# Patient Record
Sex: FEMALE | Race: ASIAN | URBAN - METROPOLITAN AREA
[De-identification: names, ages, dates, MRNs, and addresses within clinical notes are randomized per-mention and may not be internally consistent; named-entity substitution may affect disease eponyms.]

---

## 2018-08-13 ENCOUNTER — OFFICE VISIT (OUTPATIENT)
Dept: FAMILY MEDICINE | Facility: CLINIC | Age: 18
End: 2018-08-13
Payer: COMMERCIAL

## 2018-08-13 VITALS — SYSTOLIC BLOOD PRESSURE: 97 MMHG | DIASTOLIC BLOOD PRESSURE: 59 MMHG | TEMPERATURE: 98.2 F

## 2018-08-13 DIAGNOSIS — Z23 NEED FOR VACCINATION: ICD-10-CM

## 2018-08-13 DIAGNOSIS — Z71.84 TRAVEL ADVICE ENCOUNTER: Primary | ICD-10-CM

## 2018-08-13 PROCEDURE — 99402 PREV MED CNSL INDIV APPRX 30: CPT | Mod: 25 | Performed by: NURSE PRACTITIONER

## 2018-08-13 PROCEDURE — 90691 TYPHOID VACCINE IM: CPT | Performed by: NURSE PRACTITIONER

## 2018-08-13 PROCEDURE — 90472 IMMUNIZATION ADMIN EACH ADD: CPT | Performed by: NURSE PRACTITIONER

## 2018-08-13 PROCEDURE — 90717 YELLOW FEVER VACCINE SUBQ: CPT | Performed by: NURSE PRACTITIONER

## 2018-08-13 PROCEDURE — 86480 TB TEST CELL IMMUN MEASURE: CPT | Performed by: NURSE PRACTITIONER

## 2018-08-13 PROCEDURE — 90715 TDAP VACCINE 7 YRS/> IM: CPT | Performed by: NURSE PRACTITIONER

## 2018-08-13 PROCEDURE — 90471 IMMUNIZATION ADMIN: CPT | Performed by: NURSE PRACTITIONER

## 2018-08-13 RX ORDER — ALBUTEROL SULFATE 90 UG/1
2 AEROSOL, METERED RESPIRATORY (INHALATION) EVERY 6 HOURS PRN
Qty: 1 INHALER | Refills: 0 | COMMUNITY
Start: 2018-08-13

## 2018-08-13 RX ORDER — AZITHROMYCIN 500 MG/1
500 TABLET, FILM COATED ORAL DAILY
Qty: 3 TABLET | Refills: 0 | Status: SHIPPED | OUTPATIENT
Start: 2018-08-13 | End: 2018-08-16

## 2018-08-13 RX ORDER — ATOVAQUONE AND PROGUANIL HYDROCHLORIDE 250; 100 MG/1; MG/1
1 TABLET, FILM COATED ORAL DAILY
Qty: 60 TABLET | Refills: 0 | Status: SHIPPED | OUTPATIENT
Start: 2018-08-13

## 2018-08-13 ASSESSMENT — ANXIETY QUESTIONNAIRES
GAD7 TOTAL SCORE: 3
6. BECOMING EASILY ANNOYED OR IRRITABLE: NOT AT ALL
3. WORRYING TOO MUCH ABOUT DIFFERENT THINGS: SEVERAL DAYS
5. BEING SO RESTLESS THAT IT IS HARD TO SIT STILL: NOT AT ALL
2. NOT BEING ABLE TO STOP OR CONTROL WORRYING: SEVERAL DAYS
7. FEELING AFRAID AS IF SOMETHING AWFUL MIGHT HAPPEN: NOT AT ALL
1. FEELING NERVOUS, ANXIOUS, OR ON EDGE: SEVERAL DAYS
IF YOU CHECKED OFF ANY PROBLEMS ON THIS QUESTIONNAIRE, HOW DIFFICULT HAVE THESE PROBLEMS MADE IT FOR YOU TO DO YOUR WORK, TAKE CARE OF THINGS AT HOME, OR GET ALONG WITH OTHER PEOPLE: NOT DIFFICULT AT ALL

## 2018-08-13 ASSESSMENT — PATIENT HEALTH QUESTIONNAIRE - PHQ9: 5. POOR APPETITE OR OVEREATING: NOT AT ALL

## 2018-08-13 NOTE — MR AVS SNAPSHOT
"              After Visit Summary   8/13/2018    Mane Jimenes    MRN: [id number]           Patient Information     Date Of Birth          2000        Visit Information        Provider Department      8/13/2018 3:30 PM Lavonne Mccartney APRN [facility]        Today's Diagnoses     Travel advice encounter    -  1    Need for vaccination          Care Instructions    Today August 13, 2018 you received the    Yellow Fever (YF)    Tetanus (Tdap) Vaccine    Typhoid - injectable. This vaccine is valid for two years.   .    These appointments can be made as a NURSE ONLY visit.    **It is very important for the vaccinations to be given on the scheduled day(s), this helps ensure you receive the full effectiveness of the vaccine.**    Please call [facility] with any questions [phone]    Thank you for visiting [facility]              Follow-ups after your visit        Who to contact     If you have questions or need follow up information about today's clinic visit or your schedule please contact [facility] directly at [phone].  Normal or non-critical lab and imaging results will be communicated to you by Likeedshart, letter or phone within 4 business days after the clinic has received the results. If you do not hear from us within 7 days, please contact the clinic through Likeedshart or phone. If you have a critical or abnormal lab result, we will notify you by phone as soon as possible.  Submit refill requests through Bulu Box or call your pharmacy and they will forward the refill request to us. Please allow 3 business days for your refill to be completed.          Additional Information About Your Visit        MyChart Information     Bulu Box lets you send messages to your doctor, view your test results, renew your prescriptions, schedule appointments and more. To sign up, go to [email or web address] . Click on \"Log in\" on the left side of the " "screen, which will take you to the Welcome page. Then click on \"Sign up Now\" on the right side of the page.     You will be asked to enter the access code listed below, as well as some personal information. Please follow the directions to create your username and password.     Your access code is: [email or web address]  Expires: 2018  4:46 PM     Your access code will  in 90 days. If you need help or a new code, please call your [place] clinic or [phone].        Care EveryWhere ID     This is your Care EveryWhere ID. This could be used by other organizations to access your [place] [facility] medical records  [id number]        Your Vitals Were     Temperature                   98.2  F (36.8  C) (Tympanic)            Blood Pressure from Last 3 Encounters:   18 97/59    Weight from Last 3 Encounters:   No data found for Wt              We Performed the Following     C YELLOW FEVER IMMUNIZATION, LIVE, SQ (STAMARIL)     TDAP, IM (10 - 64 YRS) - Adacel     TYPHOID VACCINE, IM          Today's Medication Changes          These changes are accurate as of 18  4:46 PM.  If you have any questions, ask your nurse or doctor.               Start taking these medicines.        Dose/Directions    atovaquone-proguanil 250-100 MG per tablet   Commonly known as:  MALARONE   Used for:  Travel advice encounter   Started by:  Lavonne Mccartney APRN CNP        Dose:  1 tablet   Take 1 tablet by mouth daily Start 2 days before exposure to Malaria and continue daily till  7 days after exposure.   Quantity:  60 tablet   Refills:  0       azithromycin 500 MG tablet   Commonly known as:  ZITHROMAX   Used for:  Travel advice encounter   Started by:  Lavonne Mccartney APRN CNP        Dose:  500 mg   Take 1 tablet (500 mg) by mouth daily for 3 doses Take 1 tablet a day for up to 3 days for severe diarrhea   Quantity:  3 tablet   Refills:  0       beclomethasone HFA 40 MCG/ACT inhaler   Commonly known as:  QVAR REDIHALER   Used for:  " Need for vaccination, Travel advice encounter   Started by:  Lavonne Mccartney, AMARIS VELASQUEZ        Dose:  1 puff   Inhale 1 puff into the lungs 2 times daily   Quantity:  10.6 g   Refills:  1            Where to get your medicines      These medications were sent to [facility]/pharmacy #[id number] - [place], [place] - [address]  [address] [place] [address]     Phone:  [phone]     atovaquone-proguanil 250-100 MG per tablet    azithromycin 500 MG tablet    beclomethasone HFA 40 MCG/ACT inhaler                Primary Care Provider Office Phone # Fax #    [facility] [phone] [address]  [facility] [address]        Equal Access to Services     NOE WEBB : Hadii aad ku hadasho Soomaali, waaxda luqadaha, qaybta kaalmada adeegyada, maria elena nagel . So [facility] [phone].    ATENCIÓN: Si habla español, tiene a wood disposición servicios gratuitos de asistencia lingüística. [facility] [phone].    We comply with applicable federal civil rights laws and [place] laws. We do not discriminate on the basis of race, color, national origin, age, disability, sex, sexual orientation, or gender identity.            Thank you!     Thank you for choosing [facility]  for your care. Our goal is always to provide you with excellent care. Hearing back from our patients is one way we can continue to improve our services. Please take a few minutes to complete the written survey that you may receive in the mail after your visit with us. Thank you!             Your Updated Medication List - Protect others around you: Learn how to safely use, store and throw away your medicines at www.disposemymeds.org.          This list is accurate as of 8/13/18  4:46 PM.  Always use your most recent med list.                   Brand Name Dispense Instructions for use Diagnosis    albuterol 108 (90 Base) MCG/ACT inhaler    PROAIR HFA/PROVENTIL HFA/VENTOLIN HFA    1  Inhaler    Inhale 2 puffs into the lungs every 6 hours as needed for shortness of breath / dyspnea or wheezing        atovaquone-proguanil 250-100 MG per tablet    MALARONE    60 tablet    Take 1 tablet by mouth daily Start 2 days before exposure to Malaria and continue daily till  7 days after exposure.    Travel advice encounter       azithromycin 500 MG tablet    ZITHROMAX    3 tablet    Take 1 tablet (500 mg) by mouth daily for 3 doses Take 1 tablet a day for up to 3 days for severe diarrhea    Travel advice encounter       beclomethasone HFA 40 MCG/ACT inhaler    QVAR REDIHALER    10.6 g    Inhale 1 puff into the lungs 2 times daily    Need for vaccination, Travel advice encounter

## 2018-08-13 NOTE — PATIENT INSTRUCTIONS
Today August 13, 2018 you received the    Yellow Fever (YF)    Tetanus (Tdap) Vaccine    Typhoid - injectable. This vaccine is valid for two years.   .    These appointments can be made as a NURSE ONLY visit.    **It is very important for the vaccinations to be given on the scheduled day(s), this helps ensure you receive the full effectiveness of the vaccine.**    Please call [facility] with any questions [phone]    Thank you for visiting [facility]

## 2018-08-13 NOTE — PROGRESS NOTES
Nurse Note      Itinerary:  [facility] and [place]      Departure Date: 9/8/18      Return Date: 12/27/18      Length of Trip 3 1/2 months      Reason for Travel: Study abroad           Urban or rural: both      Accommodations: Family home        IMMUNIZATION HISTORY  Have you received any immunizations within the past 4 weeks?  No  Have you ever fainted from having your blood drawn or from an injection?  No  Have you ever had a fever reaction to vaccination?  No  Have you ever had any bad reaction or side effect from any vaccination?  No  Have you ever had hepatitis A or B vaccine?  Yes  Do you live (or work closely) with anyone who has AIDS, an AIDS-like condition, any other immune disorder or who is on chemotherapy for cancer?  No  Do you have a family history of immunodeficiency?  No  Have you received any injection of immune globulin or any blood products during the past 12 months?  No    Patient roomed by Piero Byrdnithya Jimenes is a 18 year old female seen today alone for counsultation for international travel to [facility], [facility], [place] for Study abroad.  Patient will be departing in  3 week(s) and staying for   3.5 month(s) and  traveling as a student.  ([facility])    Patient itinerary :  will be in the [facility] for 1 month and [facility] > [facility] ( 1month) > [place] for 1 month which presents risk for Malaria, Dengue Fever, Chikungungya, Zika,  Trypanosomiasis, Schistosomiasis, Rabies, food borne illnesses, motor vehicle accidents, Typhoid, Leishmaniasis and Lassa Fever. exposure.      Patient's activities will include study abroad.    Patient's country of birth is [place]    Special medical concerns: asthma  Pre-travel questionnaire was completed by patient and reviewed by provider.     Vitals: BP 97/59 (BP Location: Left arm)  Temp 98.2  F (36.8  C) (Tympanic)  BMI= There is no height or weight on file to calculate BMI.    EXAM:  General:  Well-nourished, well-developed  in no acute distress.  Appears to be stated age, interacts appropriately and expresses understanding of information given to patient.    No current outpatient prescriptions on file.     There is no problem list on file for this patient.    No Known Allergies      Immunizations discussed include:   Hepatitis A:  Up to date  Hepatitis B: Up to date  Influenza: vaccine is not available  Typhoid: Ordered/given today, risks, benefits and side effects reviewed  Rabies: Insufficient time to vaccinate  Yellow Fever: Stamaril Ordered/given today - consent completed, side effects, precautions, allergies, risks discussed. Patient expressed understanding.  Turkish Encephalitis: Not indicated  Meningococcus: Up to date  Tetanus/Diphtheria: Ordered/given today, risks, benefits and side effects reviewed  Measles/Mumps/Rubella: Up to date  Cholera: Not needed  Polio: Up to date  Pneumococcal: Up to date  Varicella: Immune by disease history per patient report  Zostavax:  Not indicated  Shingrix: Not indicated  HPV:  Up to date  TB:  TB Gold tested today    Stamaril Informed Consent    The patient was provided with a copy of the IRB-approved consent form and all questions were answered before the patient agreed to participate by signing the informed consent document.   A copy of the form was provided to the patient.    Date: 08/13/2018  Consent Version Date: 05/10/2017  Consent Obtained by:  Lavonne Mccartney CNP (Lori)     HIPAA:  Yes  HIPAA Authorization Signed Date: 08/13/2018      Inclusion/Exclusion Criteria:    (Similar to Yellow Fever-VAX)      The patient met all of the following inclusion criteria in order to be eligible for the Stamaril vaccination under this EAP (Expanded Access Investigational New Drug Program)           At increased risk for YF, including researchers, laboratory workers, vaccine production staff, and those who are traveling within 30 days to a YF-endemic region or to a country requiring proof of YF  vaccination under IHRs (International Health Regulations)?       Yes     Patient is greater than or equal to 9 months of age on the day of vaccination?     Yes     Patient is greater than or equal to 18 years of age and signed and dated the Consent Forms?     Yes     Patient is < 18 years of age and parent(s)/guardian(s) signed and dated the Consent Forms?      Patient is 7 years to < 18 years of age and signed and dated the Assent form?        No Assent is required.  Patient is <7 years of age.     No      No      N/A     The patient did not meet any of the following criteria that would have excluded the patient from receiving the Stamaril vaccination under this EAP              Patient is less than 9 months of age.       No     The patient is breast-feeding and cannot stop nursing for at least 14 days after vaccination.    Note: Yellow Fever vaccine virus may be transmissible via breast milk by nursing mothers who are vaccinated during the final 2 weeks of pregnancy or post-partum.   Following transmission, infants may develop encephalitis.  The minimum time of discontinuation of breastfeeding for 14 days after vaccination is based on the expected clearance of live-attenuated vaccine virus.       No     The patient is immunosuppressed, whether congenital or idiopathic, including for example, leukemia, lymphoma, other malignancies, and patients who are receiving immunosuppressant medications (e.g. Systemic corticosteroids [greater than the standard dose of topical or inhaled steroids], alkylating drugs, antimetabolites, of other cytotoxic or immunomodulatory drugs) or radiation therapy or organ transplantation.       No     The patient has known hypersensitivity to the active substance or to any of the excipients of Stamaril vaccine or to eggs or chicken proteins.     No     The patient is symptomatic for human immunodeficiency virus (HIV) infection     No     The patient is asymptomatic for HIV infection but  accompanied by evidence of severe immune suppression    Note:  Evidence of severe immune suppression includes CD4+ T-cell counts < 200 cubic millimeters (or < 15% total lymphocytes in children aged < 6 years), or as determined by the health care provider.       No     The patient has a history of thymus dysfunction (including myasthenia gravis, thymoma, thymectomy)     No     Moderate or severe febrile illness or acute illness    Note: Participation in the EAP can be reassessed when moderate or severe febrile illness or acute illness has resolved.       No         Altitude Exposure on this trip: [place] at 10,000 ft ( mod altitude)   Past tolerance to Altitude: limited experience no memory of difficulty     ASSESSMENT/PLAN:    ICD-10-CM    1. Travel advice encounter Z71.89 beclomethasone HFA (QVAR REDIHALER) 40 MCG/ACT inhaler     C YELLOW FEVER IMMUNIZATION, LIVE, SQ (STAMARIL)     TYPHOID VACCINE, IM     TDAP, IM (10 - 64 YRS) - Adacel     atovaquone-proguanil (MALARONE) 250-100 MG per tablet     azithromycin (ZITHROMAX) 500 MG tablet     ADMIN 1st VACCINE     EA ADD'L VACCINE     Quantiferon TB Gold Plus     CANCELED: M Tuberculosis by Quantiferon   2. Need for vaccination Z23 beclomethasone HFA (QVAR REDIHALER) 40 MCG/ACT inhaler     C YELLOW FEVER IMMUNIZATION, LIVE, SQ (STAMARIL)     TYPHOID VACCINE, IM     TDAP, IM (10 - 64 YRS) - Adacel     ADMIN 1st VACCINE     EA ADD'L VACCINE       Today:  PHQ  8 (Somewhat difficult)                MAYRA:   I have reviewed general recommendations for safe travel   including: food/water precautions, insect precautions, safer sex   practices given high prevalence of Zika, HIV and other STDs,   roadway safety. Educational materials and Travax report provided.   Patient has a form to be signed which requests a Physical exam.  Will schedule a Physical with   PCP (to get established) tomorrow. Please Review MAYRA and PHQ and travel        Malaraia prophylaxis recommended: Malarone  for [facility] sent to Pharm  Symptomatic treatment for traveler's diarrhea: azithromycin  Altitude illness prevention and treatment: declined Diamox but we discussed way to avoid AMS upon arrival to [facility]       Evacuation insurance advised and resources were provided to patient.    Total visit time 30 minutes  with over 50% of time spent counseling patient as detailed above.    Lavonne Mccartney CNP

## 2018-08-13 NOTE — NURSING NOTE
Chief Complaint   Patient presents with     Travel Clinic     initial BP 97/59 (BP Location: Left arm)  Temp 98.2  F (36.8  C) (Tympanic) There is no height or weight on file to calculate BMI.  BP completed using cuff size: regular.  L  arm      Health Maintenance that is potentially due pending provider review:  NONE    n/a    Piero Flynn ma

## 2018-08-14 ENCOUNTER — OFFICE VISIT (OUTPATIENT)
Dept: FAMILY MEDICINE | Facility: CLINIC | Age: 18
End: 2018-08-14
Payer: COMMERCIAL

## 2018-08-14 VITALS
HEART RATE: 78 BPM | BODY MASS INDEX: 23.43 KG/M2 | RESPIRATION RATE: 15 BRPM | DIASTOLIC BLOOD PRESSURE: 62 MMHG | SYSTOLIC BLOOD PRESSURE: 102 MMHG | TEMPERATURE: 98.3 F | HEIGHT: 67 IN | OXYGEN SATURATION: 100 % | WEIGHT: 149.3 LBS

## 2018-08-14 DIAGNOSIS — Z00.00 ROUTINE GENERAL MEDICAL EXAMINATION AT A HEALTH CARE FACILITY: Primary | ICD-10-CM

## 2018-08-14 PROCEDURE — 99385 PREV VISIT NEW AGE 18-39: CPT | Performed by: FAMILY MEDICINE

## 2018-08-14 ASSESSMENT — ANXIETY QUESTIONNAIRES
1. FEELING NERVOUS, ANXIOUS, OR ON EDGE: SEVERAL DAYS
GAD7 TOTAL SCORE: 3
GAD7 TOTAL SCORE: 3
5. BEING SO RESTLESS THAT IT IS HARD TO SIT STILL: NOT AT ALL
2. NOT BEING ABLE TO STOP OR CONTROL WORRYING: SEVERAL DAYS
IF YOU CHECKED OFF ANY PROBLEMS ON THIS QUESTIONNAIRE, HOW DIFFICULT HAVE THESE PROBLEMS MADE IT FOR YOU TO DO YOUR WORK, TAKE CARE OF THINGS AT HOME, OR GET ALONG WITH OTHER PEOPLE: NOT DIFFICULT AT ALL
6. BECOMING EASILY ANNOYED OR IRRITABLE: NOT AT ALL
7. FEELING AFRAID AS IF SOMETHING AWFUL MIGHT HAPPEN: NOT AT ALL
3. WORRYING TOO MUCH ABOUT DIFFERENT THINGS: SEVERAL DAYS

## 2018-08-14 ASSESSMENT — PATIENT HEALTH QUESTIONNAIRE - PHQ9
SUM OF ALL RESPONSES TO PHQ QUESTIONS 1-9: 8
5. POOR APPETITE OR OVEREATING: NOT AT ALL

## 2018-08-14 NOTE — MR AVS SNAPSHOT
After Visit Summary   8/14/2018    Mane Jimenes    MRN: [id number]           Patient Information     Date Of Birth          2000        Visit Information        Provider Department      8/14/2018 6:40 PM Shakeel Lawrence MD [facility]        Today's Diagnoses     Routine general medical examination at a health care facility    -  1      Care Instructions      Preventive Health Recommendations  Female Ages 18 to 20     Yearly exam:     See your health care provider every year in order to  o Review health changes.   o Discuss preventive care.    o Review your medicines if your doctor has prescribed any.      You should be tested each year for STDs (sexually transmitted diseases).       After age 20, talk to your provider about how often you should have cholesterol testing.      If you are at risk for diabetes, you should have a diabetes test (fasting glucose).     Shots:     Get a flu shot each year.     Get a tetanus shot every 10 years.     Consider getting the shot (vaccine) that prevents cervical cancer (Gardasil).    Nutrition:     Eat at least 5 servings of fruits and vegetables each day.    Eat whole-grain bread, whole-wheat pasta and brown rice instead of white grains and rice.    Get adequate Calcium and Vitamin D.     Lifestyle    Exercise at least 150 minutes a week each week (30 minutes a day, 5 days a week). This will help you control your weight and prevent disease.    No smoking.     Wear sunscreen to prevent skin cancer.    See your dentist every six months for an exam and cleaning.          Follow-ups after your visit        Who to contact     If you have questions or need follow up information about today's clinic visit or your schedule please contact [facility] directly at [phone].  Normal or non-critical lab and imaging results will be communicated to you by MyChart, letter or phone within 4 business days after the clinic has received the results.  "If you do not hear from us within 7 days, please contact the clinic through Monscierge or phone. If you have a critical or abnormal lab result, we will notify you by phone as soon as possible.  Submit refill requests through Monscierge or call your pharmacy and they will forward the refill request to us. Please allow 3 business days for your refill to be completed.          Additional Information About Your Visit        JustInvestingharProtoStar Information     Monscierge lets you send messages to your doctor, view your test results, renew your prescriptions, schedule appointments and more. To sign up, go to [email or web address] . Click on \"Log in\" on the left side of the screen, which will take you to the Welcome page. Then click on \"Sign up Now\" on the right side of the page.     You will be asked to enter the access code listed below, as well as some personal information. Please follow the directions to create your username and password.     Your access code is: [email or web address]  Expires: 2018  4:46 PM     Your access code will  in 90 days. If you need help or a new code, please call your [place] clinic or [phone].        Care EveryWhere ID     This is your Care EveryWhere ID. This could be used by other organizations to access your [facility] medical records  [id number]        Your Vitals Were     Pulse Temperature Respirations Height Last Period Pulse Oximetry    78 98.3  F (36.8  C) (Oral) 15 5' 7\" (1.702 m) 2018 (Approximate) 100%    Breastfeeding? BMI (Body Mass Index)                No 23.38 kg/m2           Blood Pressure from Last 3 Encounters:   18 102/62   18 97/59    Weight from Last 3 Encounters:   18 149 lb 4.8 oz (67.7 kg)              Today, you had the following     No orders found for display       Primary Care Provider Office Phone # Fax #    [facility] [phone] [address]  [facility] [address]        Equal Access to Services     " NOE [facility]: Hadii aad ku stanislav Amaya, waaxda luqadaha, qaybta kaalmada adejarod, maria elena ericain hayaamary jo martinezdeja brower shona . So [facility] [phone].    ATENCIÓN: Si habla español, tiene a wood disposición servicios gratuitos de asistencia lingüística. Llame al [phone].    We comply with applicable federal civil rights laws and [place] laws. We do not discriminate on the basis of race, color, national origin, age, disability, sex, sexual orientation, or gender identity.            Thank you!     Thank you for choosing [facility]  for your care. Our goal is always to provide you with excellent care. Hearing back from our patients is one way we can continue to improve our services. Please take a few minutes to complete the written survey that you may receive in the mail after your visit with us. Thank you!             Your Updated Medication List - Protect others around you: Learn how to safely use, store and throw away your medicines at www.disposemymeds.org.          This list is accurate as of 8/14/18 11:59 PM.  Always use your most recent med list.                   Brand Name Dispense Instructions for use Diagnosis    albuterol 108 (90 Base) MCG/ACT inhaler    PROAIR HFA/PROVENTIL HFA/VENTOLIN HFA    1 Inhaler    Inhale 2 puffs into the lungs every 6 hours as needed for shortness of breath / dyspnea or wheezing        atovaquone-proguanil 250-100 MG per tablet    MALARONE    60 tablet    Take 1 tablet by mouth daily Start 2 days before exposure to Malaria and continue daily till  7 days after exposure.    Travel advice encounter       azithromycin 500 MG tablet    ZITHROMAX    3 tablet    Take 1 tablet (500 mg) by mouth daily for 3 doses Take 1 tablet a day for up to 3 days for severe diarrhea    Travel advice encounter       beclomethasone HFA 40 MCG/ACT inhaler    QVAR REDIHALER    10.6 g    Inhale 1 puff into the lungs 2 times daily    Need for vaccination, Travel advice encounter

## 2018-08-14 NOTE — PROGRESS NOTES
SUBJECTIVE:   CC: Mane Jimenes is an 18 year old woman who presents for preventive health visit.     Physical   Annual:     Getting at least 3 servings of Calcium per day:  NO    Bi-annual eye exam:  Yes    Dental care twice a year:  NO    Sleep apnea or symptoms of sleep apnea:  None    Diet:  Breakfast skipped    Frequency of exercise:  None    Taking medications regularly:  Yes    Medication side effects:  Not applicable    Additional concerns today:  No        Today's PHQ-2 Score:   PHQ-2 ( 1999 [employer]) 8/14/2018   Q1: Little interest or pleasure in doing things 1   Q2: Feeling down, depressed or hopeless 1   PHQ-2 Score 2   Q1: Little interest or pleasure in doing things Several days   Q2: Feeling down, depressed or hopeless Several days   PHQ-2 Score 2       Abuse: Current or Past(Physical, Sexual or Emotional)- No  Do you feel safe in your environment - Yes    Social History   Substance Use Topics     Smoking status: Current Some Day Smoker     Smokeless tobacco: Never Used     Alcohol use Not on file     Alcohol Use 8/14/2018   If you drink alcohol do you typically have greater than 3 drinks per day OR greater than 7 drinks per week? No   No flowsheet data found.    Reviewed orders with patient.  Reviewed health maintenance and updated orders accordingly - Yes    Mammogram not appropriate for this patient based on age.    Pertinent mammograms are reviewed under the imaging tab.  History of abnormal Pap smear: NO - under age 21, PAP not appropriate for age     Reviewed and updated as needed this visit by clinical staff  Tobacco  Allergies  Meds  Med Hx  Surg Hx  Fam Hx  Soc Hx        Reviewed and updated as needed this visit by Provider            Review of Systems  CONSTITUTIONAL: NEGATIVE for fever, chills, change in weight  INTEGUMENTARU/SKIN: NEGATIVE for worrisome rashes, moles or lesions  EYES: NEGATIVE for vision changes or irritation  ENT: NEGATIVE for ear, mouth and throat problems  RESP:  "NEGATIVE for significant cough or SOB  BREAST: NEGATIVE for masses, tenderness or discharge  CV: NEGATIVE for chest pain, palpitations or peripheral edema  GI: NEGATIVE for nausea, abdominal pain, heartburn, or change in bowel habits  : NEGATIVE for unusual urinary or vaginal symptoms. Periods are regular.  MUSCULOSKELETAL: NEGATIVE for significant arthralgias or myalgia  NEURO: NEGATIVE for weakness, dizziness or paresthesias  PSYCHIATRIC: NEGATIVE for changes in mood or affect     OBJECTIVE:   Ht 5' 7\" (1.702 m)  Wt 149 lb 4.8 oz (67.7 kg)  BMI 23.38 kg/m2  Physical Exam  GENERAL: healthy, alert and no distress  EYES: Eyes grossly normal to inspection, PERRL and conjunctivae and sclerae normal  HENT: ear canals and TM's normal, nose and mouth without ulcers or lesions  NECK: no adenopathy, no asymmetry, masses, or scars and thyroid normal to palpation  RESP: lungs clear to auscultation - no rales, rhonchi or wheezes  BREAST: normal without masses, tenderness or nipple discharge and no palpable axillary masses or adenopathy  CV: regular rate and rhythm, normal S1 S2, no S3 or S4, no murmur, click or rub, no peripheral edema and peripheral pulses strong  ABDOMEN: soft, nontender, no hepatosplenomegaly, no masses and bowel sounds normal  MS: no gross musculoskeletal defects noted, no edema  SKIN: no suspicious lesions or rashes  NEURO: Normal strength and tone, mentation intact and speech normal  PSYCH: mentation appears normal, affect normal/bright    Diagnostic Test Results:  No results found for this or any previous visit (from the past 24 hour(s)).    ASSESSMENT/PLAN:       ICD-10-CM    1. Routine general medical examination at a health care facility Z00.00        COUNSELING:  Reviewed preventive health counseling, as reflected in patient instructions       Regular exercise       Healthy diet/nutrition       Vision screening    BP Readings from Last 1 Encounters:   08/13/18 97/59     Estimated body mass index " "is 23.38 kg/(m^2) as calculated from the following:    Height as of this encounter: 5' 7\" (1.702 m).    Weight as of this encounter: 149 lb 4.8 oz (67.7 kg).    Counseling Resources:  ATP IV Guidelines  Pooled Cohorts Equation Calculator  Breast Cancer Risk Calculator  FRAX Risk Assessment  ICSI Preventive Guidelines  Dietary Guidelines for Americans, 2010  USDA's MyPlate  ASA Prophylaxis  Lung CA Screening    Shakeel Lawrence MD  [employer]  "

## 2018-08-15 LAB
GAMMA INTERFERON BACKGROUND BLD IA-ACNC: 0.06 IU/ML
M TB IFN-G BLD-IMP: NEGATIVE
M TB IFN-G CD4+ BCKGRND COR BLD-ACNC: >10 IU/ML
MITOGEN IGNF BCKGRD COR BLD-ACNC: 0 IU/ML
MITOGEN IGNF BCKGRD COR BLD-ACNC: 0 IU/ML

## 2020-03-11 ENCOUNTER — HEALTH MAINTENANCE LETTER (OUTPATIENT)
Age: 20
End: 2020-03-11

## 2021-01-03 ENCOUNTER — HEALTH MAINTENANCE LETTER (OUTPATIENT)
Age: 21
End: 2021-01-03

## 2021-04-25 ENCOUNTER — HEALTH MAINTENANCE LETTER (OUTPATIENT)
Age: 21
End: 2021-04-25

## 2021-10-10 ENCOUNTER — HEALTH MAINTENANCE LETTER (OUTPATIENT)
Age: 21
End: 2021-10-10

## 2022-05-21 ENCOUNTER — HEALTH MAINTENANCE LETTER (OUTPATIENT)
Age: 22
End: 2022-05-21

## 2022-09-18 ENCOUNTER — HEALTH MAINTENANCE LETTER (OUTPATIENT)
Age: 22
End: 2022-09-18

## 2023-06-04 ENCOUNTER — HEALTH MAINTENANCE LETTER (OUTPATIENT)
Age: 23
End: 2023-06-04